# Patient Record
Sex: FEMALE | Race: WHITE | NOT HISPANIC OR LATINO | Employment: FULL TIME | URBAN - METROPOLITAN AREA
[De-identification: names, ages, dates, MRNs, and addresses within clinical notes are randomized per-mention and may not be internally consistent; named-entity substitution may affect disease eponyms.]

---

## 2017-01-10 ENCOUNTER — HOSPITAL ENCOUNTER (OUTPATIENT)
Dept: RADIOLOGY | Facility: HOSPITAL | Age: 67
Discharge: HOME | End: 2017-01-10
Attending: INTERNAL MEDICINE
Payer: COMMERCIAL

## 2017-01-10 ENCOUNTER — CLINICAL SUPPORT (OUTPATIENT)
Dept: INTERNAL MEDICINE | Facility: CLINIC | Age: 67
End: 2017-01-10
Payer: COMMERCIAL

## 2017-01-10 ENCOUNTER — CLINICAL SUPPORT (OUTPATIENT)
Dept: INTERNAL MEDICINE | Facility: CLINIC | Age: 67
End: 2017-01-10
Attending: INTERNAL MEDICINE
Payer: COMMERCIAL

## 2017-01-10 ENCOUNTER — HOSPITAL ENCOUNTER (OUTPATIENT)
Dept: CARDIOLOGY | Facility: CLINIC | Age: 67
Discharge: HOME | End: 2017-01-10

## 2017-01-10 ENCOUNTER — OFFICE VISIT (OUTPATIENT)
Dept: PULMONOLOGY | Facility: CLINIC | Age: 67
End: 2017-01-10

## 2017-01-10 VITALS
BODY MASS INDEX: 22.02 KG/M2 | SYSTOLIC BLOOD PRESSURE: 110 MMHG | DIASTOLIC BLOOD PRESSURE: 68 MMHG | WEIGHT: 129 LBS | HEIGHT: 64 IN

## 2017-01-10 VITALS
BODY MASS INDEX: 22.02 KG/M2 | DIASTOLIC BLOOD PRESSURE: 86 MMHG | SYSTOLIC BLOOD PRESSURE: 110 MMHG | WEIGHT: 129 LBS | HEIGHT: 64 IN

## 2017-01-10 DIAGNOSIS — Z00.00 ANNUAL PHYSICAL EXAM: Primary | ICD-10-CM

## 2017-01-10 DIAGNOSIS — Z00.00 ROUTINE GENERAL MEDICAL EXAMINATION AT A HEALTH CARE FACILITY: ICD-10-CM

## 2017-01-10 DIAGNOSIS — Z00.00 ROUTINE GENERAL MEDICAL EXAMINATION AT A HEALTH CARE FACILITY: Primary | ICD-10-CM

## 2017-01-10 LAB
ALBUMIN SERPL BCP-MCNC: 4 G/DL
ALP SERPL-CCNC: 62 U/L
ALT SERPL W/O P-5'-P-CCNC: 28 U/L
ANION GAP SERPL CALC-SCNC: 6 MMOL/L
AST SERPL-CCNC: 23 U/L
BILIRUB SERPL-MCNC: 1.6 MG/DL
BUN SERPL-MCNC: 21 MG/DL
CALCIUM SERPL-MCNC: 9.8 MG/DL
CHLORIDE SERPL-SCNC: 106 MMOL/L
CHOLEST/HDLC SERPL: 2.8 {RATIO}
CO2 SERPL-SCNC: 27 MMOL/L
CREAT SERPL-MCNC: 0.9 MG/DL
DIASTOLIC DYSFUNCTION: NO
ERYTHROCYTE [DISTWIDTH] IN BLOOD BY AUTOMATED COUNT: 12.6 %
EST. GFR  (AFRICAN AMERICAN): >60 ML/MIN/1.73 M^2
EST. GFR  (NON AFRICAN AMERICAN): >60 ML/MIN/1.73 M^2
ESTIMATED AVG GLUCOSE: 100 MG/DL
GLUCOSE SERPL-MCNC: 102 MG/DL
HBA1C MFR BLD HPLC: 5.1 %
HCT VFR BLD AUTO: 43.1 %
HCV AB SERPL QL IA: NEGATIVE
HDL/CHOLESTEROL RATIO: 36.4 %
HDLC SERPL-MCNC: 143 MG/DL
HDLC SERPL-MCNC: 52 MG/DL
HGB BLD-MCNC: 14.3 G/DL
HIV 1+2 AB+HIV1 P24 AG SERPL QL IA: NEGATIVE
LDLC SERPL CALC-MCNC: 76.8 MG/DL
MCH RBC QN AUTO: 32.1 PG
MCHC RBC AUTO-ENTMCNC: 33.2 %
MCV RBC AUTO: 97 FL
NONHDLC SERPL-MCNC: 91 MG/DL
PLATELET # BLD AUTO: 224 K/UL
PMV BLD AUTO: 9.9 FL
POTASSIUM SERPL-SCNC: 4.1 MMOL/L
PROT SERPL-MCNC: 7.1 G/DL
RBC # BLD AUTO: 4.45 M/UL
SODIUM SERPL-SCNC: 139 MMOL/L
T4 FREE SERPL-MCNC: 1.46 NG/DL
TRIGL SERPL-MCNC: 71 MG/DL
TSH SERPL DL<=0.005 MIU/L-ACNC: 0.13 UIU/ML
WBC # BLD AUTO: 4.94 K/UL

## 2017-01-10 PROCEDURE — 80053 COMPREHEN METABOLIC PANEL: CPT

## 2017-01-10 PROCEDURE — 99386 PREV VISIT NEW AGE 40-64: CPT | Mod: ,,, | Performed by: INTERNAL MEDICINE

## 2017-01-10 PROCEDURE — 85027 COMPLETE CBC AUTOMATED: CPT

## 2017-01-10 PROCEDURE — 71020 XR CHEST PA AND LATERAL: CPT | Mod: TC

## 2017-01-10 PROCEDURE — 86803 HEPATITIS C AB TEST: CPT

## 2017-01-10 PROCEDURE — 97802 MEDICAL NUTRITION INDIV IN: CPT | Mod: S$GLB,,, | Performed by: INTERNAL MEDICINE

## 2017-01-10 PROCEDURE — 93015 CV STRESS TEST SUPVJ I&R: CPT | Mod: ,,, | Performed by: INTERNAL MEDICINE

## 2017-01-10 PROCEDURE — 84443 ASSAY THYROID STIM HORMONE: CPT

## 2017-01-10 PROCEDURE — 71020 XR CHEST PA AND LATERAL: CPT | Mod: 26,,, | Performed by: RADIOLOGY

## 2017-01-10 PROCEDURE — 84439 ASSAY OF FREE THYROXINE: CPT

## 2017-01-10 PROCEDURE — 99999 PR PBB SHADOW E&M-EST. PATIENT-LVL II: CPT | Mod: PBBFAC,,, | Performed by: INTERNAL MEDICINE

## 2017-01-10 PROCEDURE — 86703 HIV-1/HIV-2 1 RESULT ANTBDY: CPT

## 2017-01-10 PROCEDURE — 97750 PHYSICAL PERFORMANCE TEST: CPT | Mod: S$GLB,,, | Performed by: INTERNAL MEDICINE

## 2017-01-10 PROCEDURE — 80061 LIPID PANEL: CPT

## 2017-01-10 PROCEDURE — 83036 HEMOGLOBIN GLYCOSYLATED A1C: CPT

## 2017-01-10 RX ORDER — SIMVASTATIN 10 MG/1
1 TABLET, FILM COATED ORAL DAILY
Refills: 3 | COMMUNITY
Start: 2017-01-03

## 2017-01-10 RX ORDER — LISINOPRIL AND HYDROCHLOROTHIAZIDE 12.5; 2 MG/1; MG/1
1 TABLET ORAL DAILY
COMMUNITY
Start: 2017-01-07

## 2017-01-10 NOTE — PROGRESS NOTES
Subjective:       Patient ID: Mirna Clemente is a 66 y.o. female.    Chief Complaint: No chief complaint on file.    HPI   Mrs. Clemente has reported no previous history of pulmonary disease. She is currently on prescription medication to control her hypertension. Mrs. Clemente has also reported a hip replacement and severe arthritis that limits her physical ability at times. She currently performs aerobic exercise 5 days a week along with resistance training.   Review of Systems    Objective:      The fitness evaluation results are as follows:    D.O.S. 1/10/2017   Height (in): 64.25   Weight (lbs): 129   BMI: 22.75050   Body Fat (%): 23.85   Waist (cm): 79   Hip (cm): 91   WHR: 0.87   RBP (mmHg): 110/68   RHR (bpm): 56    Strength R (lbs)t: 43.55852    Strength Lt (lbs): 53.42298   Push-up Assessment: 20   Curl-up Assessment: 40   Flexibility Testing (cm): 39   REE (kcals): 1290       Physical Exam    Assessment:      Age/Gender Stratified Assessment:     Heart Rate: Normal   Resting BP: Normal   Body Fat %: Excellent   WHR Risk Factor: High Risk    Strength R: Below Average    Strength L: Average   Upper Body Endurance: Excellent   Abdominal Endurance: Well Above Average   Lower body Flexibility: Excellent       1. Routine general medical examination at a health care facility        Plan:    Mrs. Clemente should continue with her regular exercise routine striving for 150 minutes of moderate intensity aerobic exercise each week. She should also continue with her resistance training decreasing the weight in order to keep good form throughout the exercises. She explained that she keeps an open hand during exercises because her arthritis causes it to hurt. Daily stretching should be the focus of Mrs. Clemente's workout. This should be performed daily in order to relieve tension and promote good blood flow through the muscles. Daily stretching will help alleviate pain felt from the arthritis in her  hands and low back. Mrs. Clemente should focus on continuing with her exercise routine to maintain her overall fitness level and slow the rate at which her muscular strength decreases.

## 2017-01-10 NOTE — LETTER
January 10, 2017    Mirna Clemente  [address]  [place] [address]             [facility] - Pulmonary Services  [address] [facility] [address]  Phone: [phone] Dear Mirna,      Thank you for allowing me to serve you and perform your Executive Health exam on 1/10/2017. This letter will serve as a brief summary of the physical findings and laboratory/studies performed and recommendations at this time. Today's assessment is normal in all respects. Good luck with the new hip.       If you have any questions or concerns, please don't hesitate to call.    Sincerely,        Hussein Zimmer MD

## 2017-01-10 NOTE — PROGRESS NOTES
Subjective:       Patient ID: Mirna Clemente is a 66 y.o. female.    Chief Complaint: Annual Exam    HPI  65 yo  at [employer] comes for her periodic health exam. Last visit: . This past October, she had a total left hip replacement by Dr. Amador at [facility] with excellent result. She had severe osteoarthritis and no hx of trauma. She feels well and is back 100% and exercises on a regular basis. No other medical issues since her visit in .   Review of Systems   Constitutional: Negative.    HENT: Negative.    Eyes: Negative.    Respiratory: Negative.    Cardiovascular: Negative.         On lisniopril/HCTZ  20/12.5   Gastrointestinal: Negative.    Endocrine:        On thyroid replacement.   Genitourinary: Negative.    Musculoskeletal: Negative.         Left total hip replacement.   Skin: Negative.    Neurological: Negative.         Three family members including her father had  of complications  Of Parkinson's Disease. She has  No finding  Of Parkinson's Disease today   Psychiatric/Behavioral: Negative.    All other systems reviewed and are negative.      Objective:      Physical Exam   Constitutional: She is oriented to person, place, and time. She appears well-developed and well-nourished. No distress.   HENT:   Head: Normocephalic and atraumatic.   Right Ear: External ear normal.   Left Ear: External ear normal.   Nose: Nose normal.   Mouth/Throat: Oropharynx is clear and moist.   Eyes: Conjunctivae and EOM are normal. Pupils are equal, round, and reactive to light.   Neck: Normal range of motion. Neck supple. No JVD present. No thyromegaly present.   Cardiovascular: Normal rate, regular rhythm, normal heart sounds and intact distal pulses.  Exam reveals no gallop.    No murmur heard.  Pulmonary/Chest: Breath sounds normal. No stridor. No respiratory distress. She has no wheezes. She has no rales. She exhibits no tenderness.   Peak flow 400 l/min   Abdominal: Soft. Bowel  sounds are normal. She exhibits no distension and no mass. There is no tenderness. There is no rebound and no guarding.   Musculoskeletal: Normal range of motion. She exhibits no edema.   Lymphadenopathy:     She has no cervical adenopathy.   Neurological: She is alert and oriented to person, place, and time. She has normal reflexes. She displays normal reflexes. No cranial nerve deficit.   Skin: Skin is warm and dry. No rash noted.   Psychiatric: She has a normal mood and affect. Her behavior is normal. Judgment and thought content normal.   Nursing note and vitals reviewed.      Assessment:       No diagnosis found.    Plan:           Labs: All parameters are normal. Chest x-ray is clear and Stress EKG is negative for ischemia and she got a Duke Score of 4. IMP: Healthy Female, Left total hip replacement.

## 2017-01-10 NOTE — PROGRESS NOTES
"Nutrition Assessment  Client name:  Mirna Clemente  :  1950  Age:  66 y.o.  Gender:  female    Client states:  Very pleasant employee of [employer] here for her Executive Health physical.  Denies significant changes in her PMH since last year with the exception of a hip replacement.  Believes she is a "healthy eater" although has difficulty staying full throughout the day, particularly mid-morning and on days she exercises.  Exercises 4x/week, including cardio and strength training activities.  Notes that her father was a physician and instilled in her the health consequences of excessive carbohydrate intake.  As a result, she limits her intake of such, particularly of refined carbohydrates.  Adds that despite maintaining a healthy and active lifestyle, she began cholesterol-lowering medication this past August/September and has since seen improvements in her lipid panel.  Inquired about [place] Thin crackers and Anastasiya drinks as she has incorporated both into her diet and wants to ensure their nutrient quality.      Past Medical History   Diagnosis Date    Hypertension     Hypothyroidism        Social History    Marital status:    Employment:  [employer]  Social History   Substance Use Topics    Smoking status: Former Smoker     Packs/day: 1.00     Years: 30.00     Types: Cigarettes     Quit date: 2010    Smokeless tobacco: Never Used    Alcohol use No        Current medications:  has a current medication list which includes the following prescription(s): levothyroxine, lisinopril-hydrochlorothiazide, multivit-iron-min-folic acid, and simvastatin.  Vitamins, minerals, and/or supplements:  MVI, Calcium   Food allergies or intolerances:  NKFA     Food History  Breakfast:  Egg whites + [place] cheese + Coke Zero  Mid-morning Snack:  [place] Thin crackers  Lunch:  (typically eaten out with clients) Soup/salad  Mid-afternoon Snack:  Coffee-flavored popsicle (80-90 " "kcal)/raspberries  Dinner:  "Lean meat + vegetables" (rotisserie chicken + kale/quinoa salad mix)    Exercise History:  4x/week, including cardio and strength training activities    Lab Reports   Total Cholesterol:  143    Triglycerides:  71  HDL:  52  LDL:  76.8   Glucose:  102  HbA1c:  5.1%  BP:  110/86     Weight History  Height:  5' 4.25"     Weight:  129#  BMI:  22  % Body Fat:  23.85%    Diagnosis  RMR (Method:  Body Day):  1290 kcal  Activity Factor:  1.4  TYSON:  1806 kcal    No nutrition-related diagnosis at this time.    Intervention    Goals:  1.  Continue current exercise regimen of cardio and strength training 3-5x/week  2.  For breakfast, incorporate a serving of carbohydrates, such as fresh fruit  3.  When snacking, incorporate a source of lean protein, such as low fat cheese, low sodium turkey breast, low fat yogurt, etc.      Reviewed CMP and lipid panel; current A1c was unavailable at time of consult.  Discussed ways to promote satiety via balanced meals and snacks in addition to discussing the individual roles of carbohydrates and protein in relation to energy and satiety levels.  Reviewed food sources of lean protein and complex carbohydrates and provided examples of healthy snack ideas.  Also, answered patient's questions regarding [place] Thin crackers and Anastasiya drinks, reviewing individual nutrition labels to further enhance understanding.  Praised patient for maintaining a healthy and active lifestyle overall, encouraging her to continue such healthy habits.      Handouts provided:  Meal Planning Guide  Restaurant Guide  Eat Fit Shopping List  Eat Fit Gaye  Fast Food Guide  Vitamin/Mineral Guide    Monitoring/Evaluation    Monitor the following:  Weight  BMI    Follow Up Plan:  Follow up with client in 1-2 years    "

## 2017-10-31 ENCOUNTER — CLINICAL SUPPORT (OUTPATIENT)
Dept: OTHER | Facility: CLINIC | Age: 67
End: 2017-10-31
Payer: COMMERCIAL

## 2017-10-31 VITALS
WEIGHT: 132 LBS | HEIGHT: 65 IN | DIASTOLIC BLOOD PRESSURE: 88 MMHG | SYSTOLIC BLOOD PRESSURE: 144 MMHG | BODY MASS INDEX: 21.99 KG/M2

## 2017-10-31 DIAGNOSIS — Z00.8 HEALTH EXAMINATION IN POPULATION SURVEYS: Primary | ICD-10-CM

## 2017-10-31 LAB
GLUCOSE SERPL-MCNC: 99 MG/DL (ref 60–140)
POC CHOLESTEROL, HDL: 58 MG/DL (ref 40–?)
POC CHOLESTEROL, LDL: 83 MG/DL (ref ?–160)
POC CHOLESTEROL, TOTAL: 162 MG/DL (ref ?–240)
POC GLUCOSE FASTING: NORMAL MG/DL (ref 60–110)
POC TOTAL CHOLESTEROL / HDL RATIO: 2.8 (ref ?–6)
POC TRIGLYCERIDES: 106 MG/DL (ref ?–160)

## 2017-10-31 PROCEDURE — 82947 ASSAY GLUCOSE BLOOD QUANT: CPT | Mod: QW,S$GLB,, | Performed by: INTERNAL MEDICINE

## 2017-10-31 PROCEDURE — 80061 LIPID PANEL: CPT | Mod: QW,S$GLB,, | Performed by: INTERNAL MEDICINE

## 2017-10-31 PROCEDURE — 99401 PREV MED CNSL INDIV APPRX 15: CPT | Mod: S$GLB,,, | Performed by: INTERNAL MEDICINE

## 2018-03-05 DIAGNOSIS — Z00.00 ROUTINE GENERAL MEDICAL EXAMINATION AT A HEALTH CARE FACILITY: Primary | ICD-10-CM

## 2018-04-25 ENCOUNTER — HOSPITAL ENCOUNTER (OUTPATIENT)
Dept: CARDIOLOGY | Facility: CLINIC | Age: 68
Discharge: HOME | End: 2018-04-25

## 2018-04-25 ENCOUNTER — CLINICAL SUPPORT (OUTPATIENT)
Dept: INTERNAL MEDICINE | Facility: CLINIC | Age: 68
End: 2018-04-25
Payer: COMMERCIAL

## 2018-04-25 ENCOUNTER — OFFICE VISIT (OUTPATIENT)
Dept: PULMONOLOGY | Facility: CLINIC | Age: 68
End: 2018-04-25

## 2018-04-25 VITALS
DIASTOLIC BLOOD PRESSURE: 74 MMHG | HEART RATE: 53 BPM | SYSTOLIC BLOOD PRESSURE: 129 MMHG | WEIGHT: 128 LBS | BODY MASS INDEX: 21.33 KG/M2 | HEIGHT: 65 IN

## 2018-04-25 DIAGNOSIS — Z00.00 ROUTINE GENERAL MEDICAL EXAMINATION AT A HEALTH CARE FACILITY: Primary | ICD-10-CM

## 2018-04-25 DIAGNOSIS — Z00.00 ROUTINE GENERAL MEDICAL EXAMINATION AT A HEALTH CARE FACILITY: ICD-10-CM

## 2018-04-25 DIAGNOSIS — Z00.00 ANNUAL PHYSICAL EXAM: Primary | ICD-10-CM

## 2018-04-25 LAB
25(OH)D3+25(OH)D2 SERPL-MCNC: 37 NG/ML
ALBUMIN SERPL BCP-MCNC: 3.8 G/DL
ALP SERPL-CCNC: 63 U/L
ALT SERPL W/O P-5'-P-CCNC: 24 U/L
ANION GAP SERPL CALC-SCNC: 6 MMOL/L
AST SERPL-CCNC: 27 U/L
BILIRUB SERPL-MCNC: 1.5 MG/DL
BUN SERPL-MCNC: 16 MG/DL
CALCIUM SERPL-MCNC: 9.9 MG/DL
CHLORIDE SERPL-SCNC: 104 MMOL/L
CHOLEST SERPL-MCNC: 155 MG/DL
CHOLEST/HDLC SERPL: 3 {RATIO}
CO2 SERPL-SCNC: 28 MMOL/L
CREAT SERPL-MCNC: 0.9 MG/DL
ERYTHROCYTE [DISTWIDTH] IN BLOOD BY AUTOMATED COUNT: 12.3 %
EST. GFR  (AFRICAN AMERICAN): >60 ML/MIN/1.73 M^2
EST. GFR  (NON AFRICAN AMERICAN): >60 ML/MIN/1.73 M^2
ESTIMATED AVG GLUCOSE: 94 MG/DL
GLUCOSE SERPL-MCNC: 96 MG/DL
HBA1C MFR BLD HPLC: 4.9 %
HCT VFR BLD AUTO: 43.3 %
HDLC SERPL-MCNC: 51 MG/DL
HDLC SERPL: 32.9 %
HGB BLD-MCNC: 14.1 G/DL
LDLC SERPL CALC-MCNC: 86.4 MG/DL
MCH RBC QN AUTO: 32.6 PG
MCHC RBC AUTO-ENTMCNC: 32.6 G/DL
MCV RBC AUTO: 100 FL
NONHDLC SERPL-MCNC: 104 MG/DL
PLATELET # BLD AUTO: 227 K/UL
PMV BLD AUTO: 9.8 FL
POTASSIUM SERPL-SCNC: 4.4 MMOL/L
PROT SERPL-MCNC: 6.8 G/DL
RBC # BLD AUTO: 4.33 M/UL
SODIUM SERPL-SCNC: 138 MMOL/L
TRIGL SERPL-MCNC: 88 MG/DL
TSH SERPL DL<=0.005 MIU/L-ACNC: 0.48 UIU/ML
WBC # BLD AUTO: 4.88 K/UL

## 2018-04-25 PROCEDURE — 93000 ELECTROCARDIOGRAM COMPLETE: CPT | Mod: ,,, | Performed by: INTERNAL MEDICINE

## 2018-04-25 PROCEDURE — 97802 MEDICAL NUTRITION INDIV IN: CPT | Mod: S$GLB,,, | Performed by: INTERNAL MEDICINE

## 2018-04-25 PROCEDURE — 97750 PHYSICAL PERFORMANCE TEST: CPT | Mod: S$GLB,,, | Performed by: INTERNAL MEDICINE

## 2018-04-25 PROCEDURE — 99386 PREV VISIT NEW AGE 40-64: CPT | Mod: ,,, | Performed by: INTERNAL MEDICINE

## 2018-04-25 PROCEDURE — 84443 ASSAY THYROID STIM HORMONE: CPT

## 2018-04-25 PROCEDURE — 80053 COMPREHEN METABOLIC PANEL: CPT

## 2018-04-25 PROCEDURE — 83036 HEMOGLOBIN GLYCOSYLATED A1C: CPT

## 2018-04-25 PROCEDURE — 99999 PR PBB SHADOW E&M-EST. PATIENT-LVL III: CPT | Mod: PBBFAC,,, | Performed by: INTERNAL MEDICINE

## 2018-04-25 PROCEDURE — 85027 COMPLETE CBC AUTOMATED: CPT

## 2018-04-25 PROCEDURE — 82306 VITAMIN D 25 HYDROXY: CPT

## 2018-04-25 PROCEDURE — 80061 LIPID PANEL: CPT

## 2018-04-25 NOTE — PROGRESS NOTES
Subjective:       Patient ID: Mirna Clemente is a 67 y.o. female.    Chief Complaint: Executive Health    HPI   Mrs. Clemente was seen today for a general fitness evaluation. She has a diagnosis of hypertension which is treaded via lisinopril. She reports a hx of chronic lower back pain which she attempts to manage through regular stretching. She reports B hand arthritis pain that severely limits  strength. She is an active individual, currently participating in spin class for 60min 1x/wk, elliptical training 3x/wk, and resistance exercise 3x/wk. She performs regular stretching at least 3-4x/wk.   Review of Systems    Objective:    Fitness Testing shows the following:  D.O.S. 4/25/2018 1/10/2017   Height (in): 65.25 64.25   Weight (lbs): 128 129   BMI: 21.1816 22.005098   Body Fat (%): 23.07 23.85   Waist (cm): 78 79   Hip (cm): 94 91   WHR: 0.83 0.87   RBP (mmHg): 118/70 110/68   RHR (bpm): 54 56    Strength R (lbs)t: 40.56373 43.956228    Strength Lt (lbs): 54.03142 53.293972   Push-up Assessment: 19 20   Curl-up Assessment: 41 40   Flexibility Testing (cm): 31.5 39   REE (kcals): 1300 1290         Physical Exam    Assessment:     Age/Gender Stratified Assessment:    Resting BP: Within Normal Limits   Body Fat %: Excellent   WHR Risk Factor: Moderate Risk    Strength R: Average    Strength L: Above Average   Upper Body Endurance: Excellent   Abdominal Endurance: Above Average   Lower body Flexibiltiy: Very Good       1. Routine general medical examination at a health care facility        Plan:     Mrs. Clemente should strive to continue her current exercise routine. It was recommended that deep static stretching be performed primarily after physical activity or after a brief warm-up. Particular focus should be on maintaining general fitness levels.

## 2018-04-25 NOTE — PROGRESS NOTES
Subjective:       Patient ID: Mirna Clemente is a 67 y.o. female.    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 She feels well, exercises frequently and has no active medical complaints today. Plans on cutting back May 1st. Her  is a retired .   Review of Systems   Constitutional: Negative.    HENT: Negative.    Eyes: Negative.    Respiratory: Negative.    Cardiovascular: Negative.    Gastrointestinal: Negative.    Genitourinary: Negative.    Musculoskeletal: Negative.         Total hip replacement Left, by Dr. Amador with great results.    Skin: Negative.    Neurological: Negative.    Psychiatric/Behavioral: Negative.    All other systems reviewed and are negative.      Objective:      Physical Exam   Constitutional: She is oriented to person, place, and time. She appears well-developed and well-nourished. No distress.   HENT:   Head: Normocephalic and atraumatic.   Right Ear: External ear normal.   Left Ear: External ear normal.   Nose: Nose normal.   Mouth/Throat: Oropharynx is clear and moist.   Eyes: Conjunctivae and EOM are normal. Pupils are equal, round, and reactive to light.   Neck: Normal range of motion. Neck supple. No JVD present. No thyromegaly present.   Tenderness over the right trapezius muscle with pain with deep palpation suggestion of mild fibromyalgia. Recommend heat and alleve or advil   Cardiovascular: Normal rate, regular rhythm, normal heart sounds and intact distal pulses.  Exam reveals no gallop.    No murmur heard.  Pulmonary/Chest: Breath sounds normal. No stridor. No respiratory distress. She has no wheezes. She has no rales. She exhibits no tenderness.   Peak flow 300 l/min   NO hx of asthma  Or exertional dypsnea   Abdominal: Soft. Bowel sounds are normal. She exhibits no distension and no mass. There is no tenderness. There is no rebound and no guarding.   Musculoskeletal: Normal range of motion. She  exhibits no edema.   Lymphadenopathy:     She has no cervical adenopathy.   Neurological: She is alert and oriented to person, place, and time. She has normal reflexes. She displays normal reflexes. No cranial nerve deficit.   Skin: Skin is warm and dry. No rash noted.   Psychiatric: She has a normal mood and affect. Her behavior is normal. Judgment and thought content normal.   Nursing note and vitals reviewed.      Assessment:       No diagnosis found.    Plan:            Labs: Essentially normal. EKG slightly inverted t waves, a non specific funcition. No hx of chest pain with exercise.

## 2018-04-25 NOTE — LETTER
April 25, 2018    Mirna Clemente  [address] [address]             [facility] - Pulmonary Services  [address] [facility] [address]  Phone: [phone] Dear Mirna,    Thank you for allowing me to serve you and perform your Executive Health exam on 4/25/2018. This letter will serve as a brief summary of the physical findings and laboratory/studies performed and recommendations at this time. Today's assessment is essentially normal. 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          If you have any questions or concerns, please don't hesitate to call.    Sincerely,        Hussein Zimmer MD

## 2018-04-25 NOTE — PROGRESS NOTES
Nutrition Assessment  This is a general nutrition assessment as per the contractual agreement of the client employer's insurance provider.    Client name:  Mirna Clemente  :  1950  Age:  67 y.o.  Gender:  female    [facility]: breast cancer  Client states:  Her motto is to work out a lot so that she can splurge a lot. Her motivation to exercise is to eat what she wants at 67 years of age. She admits right at this moment she is contemplating what she will order for lunch at [employer] after this appointment. During the week she eats healthy, red meat less than weekly and has reduced Coke Zero's from 3 to 1 daily, lots of fruit, and a apple daily.She has a sweet tooth and loves coffee flavored candy, popsicles and snowballs. If [place] was open year round, she would be in trouble and have a coffee snowball daily. She attributes this coffee preference to memories of her grandmother. Of late she has not been eating salad as much due to the recent [place] scare and will limit raw greens to spinach only. In the afternoon about 2 pm she becomes bored with work and needs a snack and her go to is, the coffee candy or popsicles. When visiting her son in [facility], he took her to a restaurant named [employer]. It has the concept of build a meal in a bowl focused on Mediterranean based ingredients. It is not her all time favorite and she dines there every visit. Since last year, she has added Vitamins D and Calcium to her daily routine.When she got the results of the REE she did not think 1300 calories was so great! She would like to lose 3#, but has accepted that she cannot lose it and continue her current preferences. Today she is interested in new savory snacks with protein, frequency of consumption of red meat and has questions about soy and why it is now common for Vitamin D levels are low.    Past Medical History:   Diagnosis Date    Hypertension     Hypothyroidism        Social History    Marital status:   "  Employment:  [employer]    Social History   Substance Use Topics    Smoking status: Former Smoker     Packs/day: 1.00     Years: 30.00     Types: Cigarettes     Quit date: 11/7/2010    Smokeless tobacco: Never Used    Alcohol use No        Current medications:  has a current medication list which includes the following prescription(s): levothyroxine, lisinopril-hydrochlorothiazide, multivit-iron-min-folic acid, and simvastatin.  Vitamins, minerals, and/or supplements: Calcium + D, [place] 40 mg (for acne)    Food allergies or intolerances:  NKFA     Food History  Breakfast:  Special K protein cereal, egg white with light cheese, multi grain toast, coffee  Mid-morning Snack:  Coke zero  Lunch:  [place], kale and chicken  Mid-afternoon Snack:  Coffee flavored candy or coffee popsicles  Dinner:  Fish + vegetables  H.S. Snack:  none    Exercise History:  Spinning 1x/wk + elliptical 2x/wk with light resistance training before and after aerobic exercise + tennis skill machine 15 minutes 1x/wk + resistance training 1x/wk    Lab Reports   Total Cholesterol: 155   Triglycerides:  88  HDL:  51  LDL:  86.4   Glucose:  96  HbA1c:  4.9  BP:  118/70     Weight History  Height:  5'5.25"     Weight:  128  BMI:  21.18  % Body Fat:  23.07    Diagnosis  RMR (Method:  Body [place]):  1300 kcal  Activity Factor:  1.4  TYSON:  1820    No Nutrition related diagnosis at this time.    Intervention    Goals:  1.  Continue with exercise plan  2.  Continue with healthy labs  3.  Continue with Calcium and D supplementation   4.  Continue with healthy weekday choices    Reviewed lab values with client and complimented on good results. Complimented client on exercise routine and that her REE is an excellent number compared to the predicted value. Discussed why Vitamin D levels can be low and foods that are good sources of Vitamin D and that per values today,it is recommended that she continue with supplementation. Shared new snack " foods with protein, read labels together and wrote down brand names for future. Foods with processed soy were not recommended due to family history.  Provided info on frequency of red meat as per the Mediterranean Diet.    Handouts provided:  Meal Planning Guide  Restaurant Guide  Eat Fit Shopping List  Eat Fit Gaye  Fast Food Guide  Vitamin/Mineral Guide    Monitoring/Evaluation    Monitor the following:  Weight  BMI  % Body Fat  Caloric intake  Labs:  CMP/Lipids    Follow Up Plan:  Follow up with client in 1-2 years

## 2018-09-28 ENCOUNTER — CLINICAL SUPPORT (OUTPATIENT)
Dept: OTHER | Facility: CLINIC | Age: 68
End: 2018-09-28
Payer: COMMERCIAL

## 2018-09-28 DIAGNOSIS — Z00.8 ENCOUNTER FOR OTHER GENERAL EXAMINATION: ICD-10-CM

## 2018-09-28 PROCEDURE — 99401 PREV MED CNSL INDIV APPRX 15: CPT | Mod: S$GLB,,, | Performed by: INTERNAL MEDICINE

## 2018-09-28 PROCEDURE — 80061 LIPID PANEL: CPT | Mod: QW,S$GLB,, | Performed by: INTERNAL MEDICINE

## 2018-09-28 PROCEDURE — 82947 ASSAY GLUCOSE BLOOD QUANT: CPT | Mod: QW,S$GLB,, | Performed by: INTERNAL MEDICINE

## 2018-09-29 VITALS — BODY MASS INDEX: 21.3 KG/M2 | HEIGHT: 65 IN

## 2018-09-29 LAB
HDLC SERPL-MCNC: 64 MG/DL
POC CHOLESTEROL, LDL (DOCK): 59 MG/DL
POC CHOLESTEROL, TOTAL: 141 MG/DL
POC GLUCOSE, FASTING: 97 MG/DL
TRIGL SERPL-MCNC: 96 MG/DL

## 2019-02-25 ENCOUNTER — OFFICE VISIT (OUTPATIENT)
Dept: INFECTIOUS DISEASES | Facility: CLINIC | Age: 69
End: 2019-02-25
Payer: COMMERCIAL

## 2019-02-25 ENCOUNTER — CLINICAL SUPPORT (OUTPATIENT)
Dept: INFECTIOUS DISEASES | Facility: CLINIC | Age: 69
End: 2019-02-25
Payer: COMMERCIAL

## 2019-02-25 VITALS
SYSTOLIC BLOOD PRESSURE: 131 MMHG | TEMPERATURE: 98 F | WEIGHT: 130.75 LBS | DIASTOLIC BLOOD PRESSURE: 70 MMHG | BODY MASS INDEX: 21.79 KG/M2 | HEART RATE: 73 BPM | HEIGHT: 65 IN

## 2019-02-25 DIAGNOSIS — Z23 IMMUNIZATION DUE: ICD-10-CM

## 2019-02-25 DIAGNOSIS — Z23 IMMUNIZATION DUE: Primary | ICD-10-CM

## 2019-02-25 DIAGNOSIS — Z71.84 TRAVEL ADVICE ENCOUNTER: ICD-10-CM

## 2019-02-25 PROCEDURE — 90471 IMMUNIZATION ADMIN: CPT | Mod: ,,, | Performed by: INTERNAL MEDICINE

## 2019-02-25 PROCEDURE — 90632 HEPA VACCINE ADULT IM: CPT | Mod: ,,, | Performed by: INTERNAL MEDICINE

## 2019-02-25 PROCEDURE — 99999 PR PBB SHADOW E&M-EST. PATIENT-LVL III: ICD-10-PCS | Mod: PBBFAC,,, | Performed by: INTERNAL MEDICINE

## 2019-02-25 PROCEDURE — 99401 PR PREVENT COUNSEL,INDIV,15 MIN: ICD-10-PCS | Mod: ,,, | Performed by: INTERNAL MEDICINE

## 2019-02-25 PROCEDURE — 90472 IMMUNIZATION ADMIN EACH ADD: CPT | Mod: ,,, | Performed by: INTERNAL MEDICINE

## 2019-02-25 PROCEDURE — 90472 TYPHOID VICPS VACCINE IM: ICD-10-PCS | Mod: ,,, | Performed by: INTERNAL MEDICINE

## 2019-02-25 PROCEDURE — 90471 HEPATITIS A VACCINE ADULT IM: ICD-10-PCS | Mod: ,,, | Performed by: INTERNAL MEDICINE

## 2019-02-25 PROCEDURE — 90691 TYPHOID VICPS VACCINE IM: ICD-10-PCS | Mod: ,,, | Performed by: INTERNAL MEDICINE

## 2019-02-25 PROCEDURE — 99999 PR PBB SHADOW E&M-EST. PATIENT-LVL III: CPT | Mod: PBBFAC,,, | Performed by: INTERNAL MEDICINE

## 2019-02-25 PROCEDURE — 90632 HEPATITIS A VACCINE ADULT IM: ICD-10-PCS | Mod: ,,, | Performed by: INTERNAL MEDICINE

## 2019-02-25 PROCEDURE — 99401 PREV MED CNSL INDIV APPRX 15: CPT | Mod: ,,, | Performed by: INTERNAL MEDICINE

## 2019-02-25 PROCEDURE — 90691 TYPHOID VACCINE IM: CPT | Mod: ,,, | Performed by: INTERNAL MEDICINE

## 2019-02-25 RX ORDER — TETANUS TOXOID, REDUCED DIPHTHERIA TOXOID AND ACELLULAR PERTUSSIS VACCINE, ADSORBED 5; 2.5; 8; 8; 2.5 [IU]/.5ML; [IU]/.5ML; UG/.5ML; UG/.5ML; UG/.5ML
SUSPENSION INTRAMUSCULAR
Refills: 0 | COMMUNITY
Start: 2019-01-05

## 2019-02-25 RX ORDER — AZITHROMYCIN 500 MG/1
500 TABLET, FILM COATED ORAL DAILY
Qty: 3 TABLET | Refills: 0 | Status: SHIPPED | OUTPATIENT
Start: 2019-02-25 | End: 2019-02-28

## 2019-02-25 RX ORDER — ISOTRETINOIN 40 MG/1
40 CAPSULE ORAL
COMMUNITY

## 2019-02-25 RX ORDER — ATOVAQUONE AND PROGUANIL HYDROCHLORIDE 250; 100 MG/1; MG/1
TABLET, FILM COATED ORAL
Qty: 38 TABLET | Refills: 0 | Status: SHIPPED | OUTPATIENT
Start: 2019-02-25 | End: 2019-07-08 | Stop reason: ALTCHOICE

## 2019-02-25 RX ORDER — NEOMYCIN SULFATE, POLYMYXIN B SULFATE, HYDROCORTISONE 3.5; 10000; 1 MG/ML; [USP'U]/ML; MG/ML
SOLUTION/ DROPS AURICULAR (OTIC)
Refills: 3 | COMMUNITY
Start: 2019-02-04

## 2019-02-25 NOTE — PROGRESS NOTES
Subjective:      Chief Complaint:   Chief Complaint   Patient presents with    Travel Consult     History of Present Illness    Patient  68 y.o. female who presents today for routine pretravel consultation.  The patient reports a past medical history of arthritis.  The patient reports the following medication allergies; none.  The patient reports the following food allergies; none.  The patient will be traveling to  [place] on may 1.  The patient will be at this destination for 4 weeks.  They will go to [facility] and [facility] in [place].  Then to [facility] in [place].  Then to [place].  Then [place] will be the last country on the trip.  The patient will be lodging at hotels. The patient will be lodging at hotels.  The patient has travelled to the following other countries in the past; [place].  The patient reports that they received all their childhood vaccinations.  She knows she got mumps as a child.   The patient reports receipt of the following travel related vaccinations; none.  The purpose of this trip is vacation.      Review of Systems   Musculoskeletal: Positive for joint pain.       Objective:   Physical Exam   Assessment:     Pre-Travel clinic assessment    Plan:   Patient specific risks:      Patient does not have any medical problems that would restrict her travel.    Destination specific risks:      -Infectious Disease risks:       Mosquito Borne pathogens:  Reviewed basic mosquito avoidance precautions including wearing long sleeve clothing and insect repellant.  Counseled about risk of dengue.  Malarone for malaria prophylaxis was prescribed.     Food Borne pathogens:  Reviewed basic hand, food and water sanitation precautions.  Patient instructed to take hand  on their trip.  Will give hepatitis a and typhoid IM vaccine.  Azithromycin was prescribed for use as needed for severe diarrhea.     Routine:  She had Tdap in January.  She has had influenza vaccine.    -Environmental risks:      Precautions to minimize risk/exposure to crime and motor vehicle accidents were reviewed with the patient.

## 2019-04-17 DIAGNOSIS — Z00.00 ROUTINE GENERAL MEDICAL EXAMINATION AT A HEALTH CARE FACILITY: Primary | ICD-10-CM

## 2019-07-08 ENCOUNTER — OFFICE VISIT (OUTPATIENT)
Dept: PULMONOLOGY | Facility: CLINIC | Age: 69
End: 2019-07-08
Payer: MEDICARE

## 2019-07-08 ENCOUNTER — CLINICAL SUPPORT (OUTPATIENT)
Dept: INTERNAL MEDICINE | Facility: CLINIC | Age: 69
End: 2019-07-08
Attending: INTERNAL MEDICINE
Payer: MEDICARE

## 2019-07-08 ENCOUNTER — HOSPITAL ENCOUNTER (OUTPATIENT)
Dept: RADIOLOGY | Facility: HOSPITAL | Age: 69
Discharge: HOME | End: 2019-07-08
Attending: INTERNAL MEDICINE
Payer: MEDICARE

## 2019-07-08 ENCOUNTER — CLINICAL SUPPORT (OUTPATIENT)
Dept: INTERNAL MEDICINE | Facility: CLINIC | Age: 69
End: 2019-07-08
Attending: INTERNAL MEDICINE

## 2019-07-08 VITALS
HEART RATE: 56 BPM | SYSTOLIC BLOOD PRESSURE: 148 MMHG | DIASTOLIC BLOOD PRESSURE: 73 MMHG | HEIGHT: 65 IN | BODY MASS INDEX: 20.83 KG/M2 | WEIGHT: 125 LBS

## 2019-07-08 DIAGNOSIS — Z00.00 ROUTINE GENERAL MEDICAL EXAMINATION AT A HEALTH CARE FACILITY: Primary | ICD-10-CM

## 2019-07-08 DIAGNOSIS — Z00.00 ROUTINE GENERAL MEDICAL EXAMINATION AT A HEALTH CARE FACILITY: ICD-10-CM

## 2019-07-08 DIAGNOSIS — Z00.00 ANNUAL PHYSICAL EXAM: Primary | ICD-10-CM

## 2019-07-08 PROCEDURE — 77067 MAMMO DIGITAL SCREENING BILAT WITH TOMOSYNTHESIS_CAD: ICD-10-PCS | Mod: 26,,, | Performed by: RADIOLOGY

## 2019-07-08 PROCEDURE — 97750 PHYSICAL PERFORMANCE TEST: CPT | Mod: S$GLB,,, | Performed by: INTERNAL MEDICINE

## 2019-07-08 PROCEDURE — 71046 XR CHEST PA AND LATERAL: ICD-10-PCS | Mod: 26,,, | Performed by: RADIOLOGY

## 2019-07-08 PROCEDURE — 97802 MEDICAL NUTRITION INDIV IN: CPT | Mod: S$GLB,,, | Performed by: INTERNAL MEDICINE

## 2019-07-08 PROCEDURE — 71046 X-RAY EXAM CHEST 2 VIEWS: CPT | Mod: TC,FY

## 2019-07-08 PROCEDURE — 71046 X-RAY EXAM CHEST 2 VIEWS: CPT | Mod: 26,,, | Performed by: RADIOLOGY

## 2019-07-08 PROCEDURE — 97802 PR MED NUTR THER, 1ST, INDIV, EA 15 MIN: ICD-10-PCS | Mod: S$GLB,,, | Performed by: INTERNAL MEDICINE

## 2019-07-08 PROCEDURE — 77063 BREAST TOMOSYNTHESIS BI: CPT | Mod: 26,,, | Performed by: RADIOLOGY

## 2019-07-08 PROCEDURE — 97750 PR PHYSICAL PERFORMANCE TEST: ICD-10-PCS | Mod: S$GLB,,, | Performed by: INTERNAL MEDICINE

## 2019-07-08 PROCEDURE — 99387 PR PREVENTIVE VISIT,NEW,65 & OVER: ICD-10-PCS | Mod: S$GLB,,, | Performed by: INTERNAL MEDICINE

## 2019-07-08 PROCEDURE — 77063 MAMMO DIGITAL SCREENING BILAT WITH TOMOSYNTHESIS_CAD: ICD-10-PCS | Mod: 26,,, | Performed by: RADIOLOGY

## 2019-07-08 PROCEDURE — 99387 INIT PM E/M NEW PAT 65+ YRS: CPT | Mod: S$GLB,,, | Performed by: INTERNAL MEDICINE

## 2019-07-08 PROCEDURE — 99999 PR PBB SHADOW E&M-EST. PATIENT-LVL III: ICD-10-PCS | Mod: PBBFAC,,, | Performed by: INTERNAL MEDICINE

## 2019-07-08 PROCEDURE — 99999 PR PBB SHADOW E&M-EST. PATIENT-LVL III: CPT | Mod: PBBFAC,,, | Performed by: INTERNAL MEDICINE

## 2019-07-08 PROCEDURE — 77067 SCR MAMMO BI INCL CAD: CPT | Mod: TC

## 2019-07-08 PROCEDURE — 77067 SCR MAMMO BI INCL CAD: CPT | Mod: 26,,, | Performed by: RADIOLOGY

## 2019-07-08 RX ORDER — LEVOTHYROXINE SODIUM 100 UG/1
1 TABLET ORAL EVERY MORNING
Refills: 1 | COMMUNITY
Start: 2019-06-21

## 2019-07-08 NOTE — LETTER
July 15, 2019    Mirna Clemente  [address]  [place] [address]             [facility] - Pulmonary Services  [address]  [place] [address]  Phone: [phone] Dear Mirna,      Thank you for allowing me to serve you and perform your Executive Health exam on 7/8/2019. This letter will serve as a brief summary of the physical findings and laboratory/studies performed and recommendations at this time. Today's assessment is essentially normal. [facility] seems to agree with you.       If you have any questions or concerns, please don't hesitate to call.    Sincerely,        Hussein Zimmer MD

## 2019-07-08 NOTE — PROGRESS NOTES
"Nutrition Assessment  Client name:  Mirna Clemente  :  1950  Age:  68 y.o.  Gender:  female    Client states:  Very pleasant employee of [employer] here for her annual [employer] physical.  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  Maintains an active lifestyle for the main "goal of eating."  Frequents [employer] ~5x/week either before or after work to perform various exercises, including cardio, strength training, group exercise class (at local [facility]), and stretching.  Realizes the importance of stretching more so than ever before as she has noticed increasing aches and pains with aging.  Believes she unintentionally lost a few pounds over the past few years as a result of increased energy expenditure at work and home.  Eats three meals and two snacks daily, consisting of egg whites, dairy, lean protein, vegetables, whole grains, etc.  Limits beef intake to 1-2x/month, selecting mostly fish, salmon, chicken, and other such sources of lean protein.  Snacks on turkey breast, cheese, and/or Trisquits, inquiring about alternative savory snack choices, noting that she does not like nut butter.  Drinks mostly coffee and water throughout the day.  Did not have blood drawn this morning as she completed blood draw last month.  Does not have any specific nutrition-related questions or concerns at this time.      *Preferred name is Mirna.    Anthropometrics  Height:  5' 4.5"     Weight:  127#  BMI:  21.5  % Body Fat:  23.22%    Clinical Signs/Symptoms  N/V/D:  None  Appetite (Good, Fair, or Poor):  Good      Past Medical History:   Diagnosis Date    Hypertension     Hypothyroidism        Past Surgical History:   Procedure Laterality Date    BREAST SURGERY      HIP SURGERY Left     THYROIDECTOMY         Medications    has a current medication list which includes the following prescription(s): " atovaquone-proguanil, boostrix tdap, isotretinoin, levothyroxine, lisinopril-hydrochlorothiazide, multivit-iron-min-folic acid, neomycin-polymyxin-hydrocortisone, and simvastatin.    Vitamins, Minerals, and/or Supplements:  MVI, Calcium     Food/Medication Interactions:  Reviewed     Food Allergies or Intolerances:  NKFA     Social History    Marital status:    Employment:  [employer]    Social History     Tobacco Use    Smoking status: Former Smoker     Packs/day: 1.00     Years: 30.00     Pack years: 30.00     Types: Cigarettes     Last attempt to quit: 2010     Years since quittin.6    Smokeless tobacco: Never Used   Substance Use Topics    Alcohol use: No        Lab Reports   Labs were not drawn this morning as she previously completed blood draw in .    Food History  Breakfast:  Egg white omelet with [place] cheese + 1 slice multi grain toast + 1-2 cups coffee + water + ~2 oz orange juice  Mid-morning Snack:  Pepper Aries cheese/Trisquits/turkey breast  Lunch:  Salad with assorted vegetables/Rouses' vegetable soup/[employer]' Asian dumpling soup + water  Mid-afternoon Snack:  Same as mid-morning snack  Dinner:  [place] or fish + vegetables (ex. broccoli, green beans, etc.) + water  H.S. Snack:  None  *Fluid intake:  1-2 cups coffee, water    Exercise History:  30 minutes weight lifting + 20 minutes machines 1x/wk + 55 minutes spin class 1x/week + 35 minutes elliptical with light weights 2x/week + 20-30 minutes hip, back, and stretching exercises 1x/wk    Cultural/Spiritual/Personal Preferences:  None identified    Support System:  Family    State of Change:  Maintenance    Barriers to Change:  None    Diagnosis    No nutrition-related diagnosis at this time.    Intervention    RMR (Method:  Body [place]):  1430 kcal  Activity Factor:  1.4  TYSON:  2002 kcal    Goals:  1.  Maintain healthy body weight  2.  Maintain >150 minutes of physical activity/week as tolerated  3.  Maintain current  healthy eating habits    Nutrition Education  Blood draw not completed today due to previous blood draw and so, reviewed 2018 CMP, lipid panel, and HbA1c, complimenting patient on maintenance of optimal values, proper nutrition, physical activity, and weight management.  Fitness consult revealed 1# weight loss, maintenance of WHR, and increased REE since last year.  Answered patient's questions regarding healthy snacking, discussing the components of a healthy snack, importance of CHO/PRO pairing for enhanced satiety, and examples of such.  Encouraged maintenance of current health behaviors for continued health promotion.    Patient verbalized understanding of nutrition education and recommendations received.    Handouts Provided  Meal Planning Guide  Restaurant Guide  Eat Fit Shopping List  Eat Fit Gaye  Fast Food Guide  Vitamin/Mineral Guide    Monitoring/Evaluation    Monitor the following:  Weight  BMI  Caloric intake    Follow Up Plan:  Communication with referring healthcare provider is unnecessary at this time as patient presented as part of annual wellness exam.  However, will follow up with patient in 1-2 years.

## 2019-07-08 NOTE — PROGRESS NOTES
Subjective:       Patient ID: Mirna Clemente is a 68 y.o. female.    Chief Complaint: No chief complaint on file.    HPI   Pt. Has no significant cardiovascular or pulmonary history.  Patient has a history of hypertension and hyperlipidemia for which she takes an Ace inhibitor and a Statin.      Physical Limitations:  Patient lives with chronic low back pain which she does not let limit her in any way.  Patient has bilateral hand arthritis which only limits her by writing as she prefers to use a computer.  Patient chose to complete the  strength test although she stated that her arthritis would limit her in her strength. Patient had her left replaced in 2015 which no longer limits her in any way.      Current exercise routine:  Patient currently attends an hour long spin class and performs resistance training exercises using heavy weights, once a week.  Patient performs full-body resistance training exercises using machines and heavy free weights followed by 20 minutes on the nu-step , once a week.  Patient ellipticals for 20 minutes followed by some light weight lifting, twice a week.  Patient performs hip, back, and abdominal exercises, 1-2 days a week.  Patient stretches an average of 4 days a week.    Goals:  Patient would like to maintain her current weight and body fat %.    Fun Facts:  Patient was friendly and engaged.  Patient is very active, especially for her age.  Patient was receptive to all recommendations made.      Review of Systems    Objective:     The fitness evaluation results are as follows:  D.O.S. 7/8/2019 4/25/2018 1/10/2017   Height (in): 64.5 65.25 64.25   Weight (lbs): 127 128 129   BMI: 21.47795 21.980615 22.225101   Body Fat (%): 23.22 23.07 23.85   Waist (cm): 78 78 79   Hip (cm): 94 94 91   WHR: 0.83 0.83 0.87   RBP (mmHg): 118/76 118/70 110/68   RHR (bpm): 58 54 56    Strength R (lbs)t: 30 40.228717 43.579406    Strength Lt (lbs): 46.501731 54.347636 53.387825    Push-up Assessment: 30 19 20   Curl-up Assessment: 50 41 40   Flexibility Testing (cm): 36 31.5 39   REE (kcals): 1430 1300 1290       Physical Exam    Assessment:     Age/gender stratified assessment:  Resting BP: Within Normal Limits   Body Fat %: Excellent   WHR Risk Factor: Moderate Risk    Strength R: Below Average    Strength L: Average   Upper Body Endurance: Excellent   Abdominal Endurance: Well Above Average   Lower body Flexibiltiy: Excellent       1. Routine general medical examination at a health care facility        Plan:       Recommended fitness guidelines:    -150 minutes of moderate intensity aerobic exercise per week or 75 minutes of vigorous intensity aerobic exercise per week.    -2 to 4 days per week of resistance training for each muscle group.      -Daily stretching with a hold of at least 30 seconds per muscle group.

## 2019-07-09 NOTE — PROGRESS NOTES
Subjective:       Patient ID: Mirna Clemente is a 68 y.o. female.    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 Has cut back her work responsibilities and feels well. Does exercise most days of the week and has no active medical complaints. Had good results from her total hip procedure by Dr. Amador. Med: zocor, lisniopril, synthroid I mg.Reviewed outside lab results done 6/14 that she brought with her. All are normal.  Review of Systems   Constitutional: Negative.    HENT: Negative.    Eyes: Negative.    Respiratory: Negative.    Cardiovascular: Negative.    Gastrointestinal: Negative.    Endocrine:        Synthroid replacement.   Genitourinary: Negative.    Musculoskeletal: Negative.         S/P Total hip replacement with good results.    Skin: Negative.    Neurological: Negative.    Psychiatric/Behavioral: Negative.    All other systems reviewed and are negative.      Objective:      Physical Exam   Constitutional: She is oriented to person, place, and time. She appears well-developed and well-nourished. No distress.   HENT:   Head: Normocephalic and atraumatic.   Right Ear: External ear normal.   Left Ear: External ear normal.   Nose: Nose normal.   Mouth/Throat: Oropharynx is clear and moist.   Eyes: Pupils are equal, round, and reactive to light. Conjunctivae and EOM are normal.   Neck: Normal range of motion. Neck supple. No JVD present. No thyromegaly present.   Cardiovascular: Normal rate, regular rhythm, normal heart sounds and intact distal pulses. Exam reveals no gallop.   No murmur heard.  Pulmonary/Chest: Breath sounds normal. No stridor. No respiratory distress. She has no wheezes. She has no rales. She exhibits no tenderness.   Peak flow 400 l/min   Abdominal: Soft. Bowel sounds are normal. She exhibits no distension and no mass. There is no tenderness. There is no rebound and no guarding.   Musculoskeletal: Normal range of motion. She  exhibits no edema.   Lymphadenopathy:     She has no cervical adenopathy.   Neurological: She is alert and oriented to person, place, and time. She has normal reflexes. She displays normal reflexes. No cranial nerve deficit.   Skin: Skin is warm and dry. No rash noted.   Psychiatric: She has a normal mood and affect. Her behavior is normal. Judgment and thought content normal.   Nursing note and vitals reviewed.      Assessment:       1. Annual physical exam        Plan:       Outside labs are normal  and Mammogram is negative for malignancy.. IMP: Healthy Female

## 2019-08-27 ENCOUNTER — CLINICAL SUPPORT (OUTPATIENT)
Dept: INFECTIOUS DISEASES | Facility: CLINIC | Age: 69
End: 2019-08-27

## 2019-08-27 DIAGNOSIS — Z23 IMMUNIZATION DUE: ICD-10-CM

## 2019-08-27 PROCEDURE — 99999 PR PBB SHADOW E&M-EST. PATIENT-LVL I: ICD-10-PCS | Mod: PBBFAC,,,

## 2019-08-27 PROCEDURE — 90471 IMMUNIZATION ADMIN: CPT | Mod: S$GLB,,, | Performed by: INTERNAL MEDICINE

## 2019-08-27 PROCEDURE — 99999 PR PBB SHADOW E&M-EST. PATIENT-LVL I: CPT | Mod: PBBFAC,,,

## 2019-08-27 PROCEDURE — 90471 HEPATITIS A VACCINE ADULT IM: ICD-10-PCS | Mod: S$GLB,,, | Performed by: INTERNAL MEDICINE

## 2019-08-27 PROCEDURE — 90632 HEPA VACCINE ADULT IM: CPT | Mod: S$GLB,,, | Performed by: INTERNAL MEDICINE

## 2019-08-27 PROCEDURE — 90632 HEPATITIS A VACCINE ADULT IM: ICD-10-PCS | Mod: S$GLB,,, | Performed by: INTERNAL MEDICINE

## 2020-08-31 DIAGNOSIS — R06.00 DYSPNEA, UNSPECIFIED TYPE: Primary | ICD-10-CM

## 2020-08-31 DIAGNOSIS — Z12.31 ENCOUNTER FOR SCREENING MAMMOGRAM FOR MALIGNANT NEOPLASM OF BREAST: ICD-10-CM

## 2020-08-31 DIAGNOSIS — Q96.0 KARYOTYPE 45, X: ICD-10-CM

## 2020-10-01 ENCOUNTER — PATIENT MESSAGE (OUTPATIENT)
Dept: OTHER | Facility: OTHER | Age: 70
End: 2020-10-01

## 2020-12-09 ENCOUNTER — OFFICE VISIT (OUTPATIENT)
Dept: PULMONOLOGY | Facility: CLINIC | Age: 70
End: 2020-12-09

## 2020-12-09 ENCOUNTER — CLINICAL SUPPORT (OUTPATIENT)
Dept: INTERNAL MEDICINE | Facility: CLINIC | Age: 70
End: 2020-12-09

## 2020-12-09 ENCOUNTER — CLINICAL SUPPORT (OUTPATIENT)
Dept: INTERNAL MEDICINE | Facility: CLINIC | Age: 70
End: 2020-12-09
Payer: MEDICARE

## 2020-12-09 ENCOUNTER — HOSPITAL ENCOUNTER (OUTPATIENT)
Dept: CARDIOLOGY | Facility: CLINIC | Age: 70
Discharge: HOME | End: 2020-12-09

## 2020-12-09 ENCOUNTER — HOSPITAL ENCOUNTER (OUTPATIENT)
Dept: RADIOLOGY | Facility: HOSPITAL | Age: 70
Discharge: HOME | End: 2020-12-09
Attending: INTERNAL MEDICINE

## 2020-12-09 VITALS
DIASTOLIC BLOOD PRESSURE: 82 MMHG | HEIGHT: 65 IN | HEART RATE: 59 BPM | BODY MASS INDEX: 20.99 KG/M2 | WEIGHT: 126 LBS | SYSTOLIC BLOOD PRESSURE: 159 MMHG

## 2020-12-09 DIAGNOSIS — R06.00 DYSPNEA, UNSPECIFIED TYPE: ICD-10-CM

## 2020-12-09 DIAGNOSIS — Z00.00 ANNUAL PHYSICAL EXAM: Primary | ICD-10-CM

## 2020-12-09 DIAGNOSIS — Z00.00 ROUTINE GENERAL MEDICAL EXAMINATION AT A HEALTH CARE FACILITY: Primary | ICD-10-CM

## 2020-12-09 LAB
ALBUMIN SERPL BCP-MCNC: 3.8 G/DL (ref 3.5–5.2)
ALP SERPL-CCNC: 59 U/L (ref 55–135)
ALT SERPL W/O P-5'-P-CCNC: 22 U/L (ref 10–44)
ANION GAP SERPL CALC-SCNC: 7 MMOL/L (ref 8–16)
AST SERPL-CCNC: 27 U/L (ref 10–40)
BILIRUB SERPL-MCNC: 1.1 MG/DL (ref 0.1–1)
BUN SERPL-MCNC: 18 MG/DL (ref 8–23)
CALCIUM SERPL-MCNC: 9.2 MG/DL (ref 8.7–10.5)
CHLORIDE SERPL-SCNC: 107 MMOL/L (ref 95–110)
CHOLEST SERPL-MCNC: 162 MG/DL (ref 120–199)
CHOLEST/HDLC SERPL: 2.5 {RATIO} (ref 2–5)
CO2 SERPL-SCNC: 27 MMOL/L (ref 23–29)
CREAT SERPL-MCNC: 0.9 MG/DL (ref 0.5–1.4)
ERYTHROCYTE [DISTWIDTH] IN BLOOD BY AUTOMATED COUNT: 12.3 % (ref 11.5–14.5)
EST. GFR  (AFRICAN AMERICAN): >60 ML/MIN/1.73 M^2
EST. GFR  (NON AFRICAN AMERICAN): >60 ML/MIN/1.73 M^2
ESTIMATED AVG GLUCOSE: 97 MG/DL (ref 68–131)
GLUCOSE SERPL-MCNC: 98 MG/DL (ref 70–110)
HBA1C MFR BLD HPLC: 5 % (ref 4–5.6)
HCT VFR BLD AUTO: 41.8 % (ref 37–48.5)
HDLC SERPL-MCNC: 65 MG/DL (ref 40–75)
HDLC SERPL: 40.1 % (ref 20–50)
HGB BLD-MCNC: 13 G/DL (ref 12–16)
LDLC SERPL CALC-MCNC: 82 MG/DL (ref 63–159)
MCH RBC QN AUTO: 31.9 PG (ref 27–31)
MCHC RBC AUTO-ENTMCNC: 31.1 G/DL (ref 32–36)
MCV RBC AUTO: 103 FL (ref 82–98)
NONHDLC SERPL-MCNC: 97 MG/DL
PLATELET # BLD AUTO: 214 K/UL (ref 150–350)
PMV BLD AUTO: 10.3 FL (ref 9.2–12.9)
POTASSIUM SERPL-SCNC: 4.1 MMOL/L (ref 3.5–5.1)
PROT SERPL-MCNC: 6.7 G/DL (ref 6–8.4)
RBC # BLD AUTO: 4.07 M/UL (ref 4–5.4)
SODIUM SERPL-SCNC: 141 MMOL/L (ref 136–145)
T4 FREE SERPL-MCNC: 1.06 NG/DL (ref 0.71–1.51)
TRIGL SERPL-MCNC: 75 MG/DL (ref 30–150)
TSH SERPL DL<=0.005 MIU/L-ACNC: 6.94 UIU/ML (ref 0.4–4)
WBC # BLD AUTO: 4.95 K/UL (ref 3.9–12.7)

## 2020-12-09 PROCEDURE — 85027 COMPLETE CBC AUTOMATED: CPT

## 2020-12-09 PROCEDURE — 80053 COMPREHEN METABOLIC PANEL: CPT

## 2020-12-09 PROCEDURE — 97750 PHYSICAL PERFORMANCE TEST: CPT | Mod: S$GLB,,, | Performed by: INTERNAL MEDICINE

## 2020-12-09 PROCEDURE — 99387 INIT PM E/M NEW PAT 65+ YRS: CPT | Mod: S$GLB,,, | Performed by: INTERNAL MEDICINE

## 2020-12-09 PROCEDURE — 99999 PR PBB SHADOW E&M-EST. PATIENT-LVL I: ICD-10-PCS | Mod: PBBFAC,,,

## 2020-12-09 PROCEDURE — 36415 COLL VENOUS BLD VENIPUNCTURE: CPT

## 2020-12-09 PROCEDURE — 93005 EKG 12-LEAD: ICD-10-PCS | Mod: S$GLB,,, | Performed by: INTERNAL MEDICINE

## 2020-12-09 PROCEDURE — 80061 LIPID PANEL: CPT

## 2020-12-09 PROCEDURE — 71046 X-RAY EXAM CHEST 2 VIEWS: CPT | Mod: 26,,, | Performed by: RADIOLOGY

## 2020-12-09 PROCEDURE — 83036 HEMOGLOBIN GLYCOSYLATED A1C: CPT

## 2020-12-09 PROCEDURE — 93010 EKG 12-LEAD: ICD-10-PCS | Mod: S$GLB,,, | Performed by: INTERNAL MEDICINE

## 2020-12-09 PROCEDURE — 71046 X-RAY EXAM CHEST 2 VIEWS: CPT | Mod: TC,FY

## 2020-12-09 PROCEDURE — 99999 PR PBB SHADOW E&M-EST. PATIENT-LVL III: CPT | Mod: PBBFAC,,, | Performed by: INTERNAL MEDICINE

## 2020-12-09 PROCEDURE — 84443 ASSAY THYROID STIM HORMONE: CPT

## 2020-12-09 PROCEDURE — 71046 XR CHEST PA AND LATERAL: ICD-10-PCS | Mod: 26,,, | Performed by: RADIOLOGY

## 2020-12-09 PROCEDURE — 99387 PR PREVENTIVE VISIT,NEW,65 & OVER: ICD-10-PCS | Mod: S$GLB,,, | Performed by: INTERNAL MEDICINE

## 2020-12-09 PROCEDURE — 99999 PR PBB SHADOW E&M-EST. PATIENT-LVL III: ICD-10-PCS | Mod: PBBFAC,,, | Performed by: INTERNAL MEDICINE

## 2020-12-09 PROCEDURE — 97802 PR MED NUTR THER, 1ST, INDIV, EA 15 MIN: ICD-10-PCS | Mod: S$GLB,,, | Performed by: INTERNAL MEDICINE

## 2020-12-09 PROCEDURE — 97750 PR PHYSICAL PERFORMANCE TEST: ICD-10-PCS | Mod: S$GLB,,, | Performed by: INTERNAL MEDICINE

## 2020-12-09 PROCEDURE — 84439 ASSAY OF FREE THYROXINE: CPT

## 2020-12-09 PROCEDURE — 93010 ELECTROCARDIOGRAM REPORT: CPT | Mod: S$GLB,,, | Performed by: INTERNAL MEDICINE

## 2020-12-09 PROCEDURE — 93005 ELECTROCARDIOGRAM TRACING: CPT | Mod: S$GLB,,, | Performed by: INTERNAL MEDICINE

## 2020-12-09 PROCEDURE — 97802 MEDICAL NUTRITION INDIV IN: CPT | Mod: S$GLB,,, | Performed by: INTERNAL MEDICINE

## 2020-12-09 PROCEDURE — 99999 PR PBB SHADOW E&M-EST. PATIENT-LVL I: CPT | Mod: PBBFAC,,,

## 2020-12-09 NOTE — PROGRESS NOTES
"Nutrition Assessment  Client name:  Mirna Clemente   (Annual  physical)  :  1950  Age:  70 y.o.  Gender:  female    Client states:  She is excited that her new [employer] bike was delivered yesterday and looks forward to using it. Exercise makes her feel invigorated and she can eat whatever she wants and that is of high importance. Currently participating in a spinning class and does resistance training, however will be retiring in April and looks forward to working out in her home. Since last visit she experienced a minor kidney stone therefore was taken off of the calcium supplementation.Was drinking 2-3 diet cokes daily and has reduced to 1-2 per week and replaced with water. To assist with wt maintenance, she intentionally monitors her carb intake and is selective about the desserts she consumes. 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 She questions me If Ice beverage.is a good choice. Her goal is to maintain her current health status and continue to exercise and enjoy her jail.    Anthropometrics  Height:  5'4"     Weight:  128#  BMI:  22.01  % Body Fat:  23.90    Clinical Signs/Symptoms  N/V/D:  none  Appetite:  good       Past Medical History:   Diagnosis Date    Hypertension     Hypothyroidism        Past Surgical History:   Procedure Laterality Date    BREAST BIOPSY      BREAST SURGERY      HIP SURGERY Left     THYROIDECTOMY         Medications    has a current medication list which includes the following prescription(s): boostrix tdap, isotretinoin, levothyroxine, lisinopril-hydrochlorothiazide, multivit-iron-min-folic acid, neomycin-polymyxin-hydrocortisone, and simvastatin.    Vitamins, Minerals, and/or Supplements:  Potasium  Citrate     Food/Medication Interactions:  Reviewed     Food Allergies or Intolerances:  none     Social History  "   Marital status:    Employment:  [employer]    Social History     Tobacco Use    Smoking status: Former Smoker     Packs/day: 1.00     Years: 30.00     Pack years: 30.00     Types: Cigarettes     Quit date: 11/7/2010     Years since quitting: 10.0    Smokeless tobacco: Never Used   Substance Use Topics    Alcohol use: No        Lab Reports   Total Cholesterol:  162    Triglycerides:  75  HDL:  65  LDL:  82   Glucose:  98  HbA1c:  5.0  BP Readings from Last 1 Encounters:   12/09/20 (!) 159/82       Food History  Breakfast:  Cottage cheese, water  Mid-morning Snack:  Dried mushrooms and okra   Lunch:  Chicken salad on 1 slice Antoni's bread, gumbo, water  Mid-afternoon Snack:  fruit  Dinner:  Red beans and rice, salad, water  H.S. Snack:  none  *Fluid intake:  Water, Ice beverage     Exercise History: Spinning 1x/wk + elliptical 1x/wk for 45 minutes + treadmill 15 minutes, 1x/wk + resistance training 30 minutes, 2x/wk 35 minute walk, plays with grandchildren in jungle gym     Cultural/Spiritual/Personal Preferences:  None identified    Support System:  spouse    State of Change:  Action    Barriers to Change:  None identified    Diagnosis    Other: No nutrition realted diagnosis at this time.    Intervention    RMR (Method:  InBody):  1320 kcal  Activity Factor:  1.4    TYSON:  1848 kcal    Goals:  1. Continue with exercise plan  2. Continue with healthy labs  3. Continue with MVI      4. Continue with healthy weekday choices     Nutrition Education  Reviewed and explained laboratory results and complimented client on healthy values, wt. maintenance and exercise program. Answered question about Ice beverage which contains Splenda.  Provided name brands of healthy savory snacks which contain protein and documented them for client in take home packet.     Patient verbalized understanding of nutrition education and recommendations received.    Handouts Provided  Meal Planning Guide  Restaurant  Guide  Eat Fit Shopping List  Eat Fit Gaye  Fast Food Guide  Vitamin/Mineral Guide    Monitoring/Evaluation    Monitor the following:  Weight  BMI  % Body Fat  Caloric intake  Labs:  Lipids/HAIC    Follow Up Plan:  Communication with referring healthcare provider is unnecessary at this time as patient presented as part of annual wellness exam.  However, will follow up with patient in 1-2 years.

## 2020-12-09 NOTE — PROGRESS NOTES
Subjective:       Patient ID: Mirna Clemente is a 70 y.o. female.    Chief Complaint: Annual Exam    HPI   69 yo  comes for her periodic health exam. She is planning on retiring this Spring. She feels well, has no active medical complaints. No significant medical encounters since last year. On replacement synthroid  100 mcg after a thyroidectomy years ago for a nodule--benign, [id number]/12. And accutane    Rides a peloton for exericse  Review of Systems   Constitutional: Negative.    HENT: Negative.    Eyes: Negative.    Respiratory: Negative.    Cardiovascular: Negative.         Zestorectic 20/12.5BP 130/88   Gastrointestinal: Negative.    Endocrine:        Thyroid replacement.   Genitourinary: Negative.    Musculoskeletal: Negative.    Integumentary:  Negative.   Neurological: Negative.    Psychiatric/Behavioral: Negative.    All other systems reviewed and are negative.        Objective:      Physical Exam  Vitals signs and nursing note reviewed.   Constitutional:       General: She is not in acute distress.     Appearance: She is well-developed.   HENT:      Head: Normocephalic and atraumatic.      Right Ear: External ear normal.      Left Ear: External ear normal.      Nose: Nose normal.   Eyes:      Conjunctiva/sclera: Conjunctivae normal.      Pupils: Pupils are equal, round, and reactive to light.   Neck:      Musculoskeletal: Normal range of motion and neck supple.      Thyroid: No thyromegaly.      Vascular: No JVD.   Cardiovascular:      Rate and Rhythm: Normal rate and regular rhythm.      Heart sounds: Normal heart sounds. No murmur. No gallop.    Pulmonary:      Effort: No respiratory distress.      Breath sounds: Normal breath sounds. No stridor. No wheezing or rales.   Chest:      Chest wall: No tenderness.   Abdominal:      General: Bowel sounds are normal. There is no distension.      Palpations: Abdomen is soft. There is no mass.      Tenderness: There is no abdominal  tenderness. There is no guarding or rebound.   Musculoskeletal: Normal range of motion.   Lymphadenopathy:      Cervical: No cervical adenopathy.   Skin:     General: Skin is warm and dry.      Findings: No rash.   Neurological:      Mental Status: She is alert and oriented to person, place, and time.      Cranial Nerves: No cranial nerve deficit.      Deep Tendon Reflexes: Reflexes are normal and symmetric. Reflexes normal.   Psychiatric:         Behavior: Behavior normal.         Thought Content: Thought content normal.         Judgment: Judgment normal.         Assessment:       1. Annual physical exam        Plan:       Labs: All parameters are normal., TSH slightly elevated but normal T-4:1.06  Chest x-ray is normal  EKG Essentially normal and unchanged.

## 2020-12-09 NOTE — PROGRESS NOTES
Subjective:       Patient ID: Mirna Clemente is a 70 y.o. female.    Chief Complaint: [employer]    HPI  Review of Systems    Ms. Clemente reports no hx of cardiovascular or pulmonary disease. She reports hx of chronic LBP, B arthritis in her hands, and hip replacement surgery. She reports no limitations to physical activity due to hx. Ms. Clemente reports diagnosed hypertension and hyperlipidemia treated with lisinopril and simvastatin.     Current exercise routine includes:   - Cardiovascular: Daily walking for 2+ miles each session. Spin class 1x/wk for 60min, and elliptical training 1x/wk for 30min each session.   - Musculoskeletal: Free weight exercise including dumbbells 1x/wk at vigorous and 2-3x/wk at light intensities.   - Flexibility: regular stretching    Overall, Ms Clemente reports no significant change in activity levels over the last year.   Objective:    Fitness testing shows the following:    D.O.S. 12/9/2020 7/8/2019 4/25/2018 1/10/2017   Height (in): 64 64.5 65.25 64.25   Weight (lbs): 128 127 128 129   BMI: 22.52896 21.62187 21.422701 22.02214   Body Fat (%): 23.90 23.22 23.07 23.85   Waist (cm): 77 78 78 79   Hip (cm): 92 94 94 91   WHR: 0.84 0.83 0.83 0.87   RBP (mmHg): 118/78 118/76 118/70 110/68   RHR (bpm): 62 58 54 56    Strength R (lbs)t: 35 30 40.117885 43.90482    Strength Lt (lbs): 50 46.121889 54.439574 53.19599   Push-up Assessment: 39 30 19 20   Curl-up Assessment: 48 50 41 40   Flexibility Testing (cm): 31 36 31.5 39   REE (kcals): 1320 1430 1300 1290       Physical Exam    Assessment:     Age/Gender Stratified Assessment:    Resting BP: Within Normal Limits   Body Fat %: Excellent   WHR Risk Factor: Moderate    Strength R: Below Average    Strength L: Below Average   Upper Body Endurance: Excellent   Abdominal Endurance: Above Average   Lower body Flexibiltiy: Good       1. Routine general medical examination at a health care facility        Plan:     Ms.  Chyna should strive to continue her current exercise routine including the recommendations below:      - Cardiovascular: Perform 30-60min/day 5x/wk (>150min/wk) of moderate (98-116bpm) intensity exercise. More vigorous aerobics (>116bpm) can maintain or improve cardiovascular health with at least 75min/wk. Recommended to continue current aerobic exercise routine.     - Musculoskeletal: Perform 2-4 sets of 8-12 repetitions at moderate intensity 2-4x/wk for each muscle group. Recommended to continue current resistance training routine.     - Flexibility: Perform 2-4 stretches for 30s for each muscle group daily for best results. Recommended to continue regular stretching.

## 2020-12-09 NOTE — LETTER
December 10, 2020    Mirna Clemente  [address]  [place] [address]             [facility] [address]  [address]  [facility] [place] [address]  Phone: [phone] Dear Mirna,      Thank you for allowing me to serve you and perform your Executive Health exam on 12/9/2020. This letter will serve as a brief summary of the physical findings and laboratory/studies performed and recommendations at this time. Today's assessment is essentially normal in all respects. You are ready for [facility]. Is [place]/         If you have any questions or concerns, please don't hesitate to call.    Sincerely,        Hussein Zimmer MD

## 2020-12-10 ENCOUNTER — PATIENT MESSAGE (OUTPATIENT)
Dept: PULMONOLOGY | Facility: CLINIC | Age: 70
End: 2020-12-10

## 2020-12-11 ENCOUNTER — PATIENT MESSAGE (OUTPATIENT)
Dept: OTHER | Facility: OTHER | Age: 70
End: 2020-12-11

## 2021-01-18 ENCOUNTER — PATIENT MESSAGE (OUTPATIENT)
Dept: PULMONOLOGY | Facility: CLINIC | Age: 71
End: 2021-01-18

## 2021-04-16 ENCOUNTER — PATIENT MESSAGE (OUTPATIENT)
Dept: RESEARCH | Facility: HOSPITAL | Age: 71
End: 2021-04-16

## 2021-11-09 DIAGNOSIS — Z12.31 ENCOUNTER FOR SCREENING MAMMOGRAM FOR MALIGNANT NEOPLASM OF BREAST: ICD-10-CM

## 2021-11-09 DIAGNOSIS — Z00.00 ROUTINE GENERAL MEDICAL EXAMINATION AT A HEALTH CARE FACILITY: Primary | ICD-10-CM
